# Patient Record
Sex: FEMALE | Race: WHITE
[De-identification: names, ages, dates, MRNs, and addresses within clinical notes are randomized per-mention and may not be internally consistent; named-entity substitution may affect disease eponyms.]

---

## 2020-06-04 ENCOUNTER — HOSPITAL ENCOUNTER (EMERGENCY)
Dept: HOSPITAL 62 - ER | Age: 25
Discharge: HOME | End: 2020-06-04
Payer: OTHER GOVERNMENT

## 2020-06-04 VITALS — DIASTOLIC BLOOD PRESSURE: 60 MMHG | SYSTOLIC BLOOD PRESSURE: 115 MMHG

## 2020-06-04 DIAGNOSIS — Z3A.00: ICD-10-CM

## 2020-06-04 DIAGNOSIS — O26.892: ICD-10-CM

## 2020-06-04 DIAGNOSIS — O36.8120: ICD-10-CM

## 2020-06-04 DIAGNOSIS — Z79.899: ICD-10-CM

## 2020-06-04 DIAGNOSIS — O46.92: Primary | ICD-10-CM

## 2020-06-04 DIAGNOSIS — R10.33: ICD-10-CM

## 2020-06-04 LAB
ADD MANUAL DIFF: NO
ALBUMIN SERPL-MCNC: 4.3 G/DL (ref 3.5–5)
ALP SERPL-CCNC: 77 U/L (ref 38–126)
ANION GAP SERPL CALC-SCNC: 8 MMOL/L (ref 5–19)
APPEARANCE UR: CLEAR
APTT PPP: YELLOW S
AST SERPL-CCNC: 32 U/L (ref 14–36)
BASOPHILS # BLD AUTO: 0.1 10^3/UL (ref 0–0.2)
BASOPHILS NFR BLD AUTO: 0.5 % (ref 0–2)
BILIRUB DIRECT SERPL-MCNC: 0 MG/DL (ref 0–0.4)
BILIRUB SERPL-MCNC: 0.5 MG/DL (ref 0.2–1.3)
BILIRUB UR QL STRIP: NEGATIVE
BUN SERPL-MCNC: 7 MG/DL (ref 7–20)
CALCIUM: 9.6 MG/DL (ref 8.4–10.2)
CHLORIDE SERPL-SCNC: 102 MMOL/L (ref 98–107)
CO2 SERPL-SCNC: 24 MMOL/L (ref 22–30)
EOSINOPHIL # BLD AUTO: 0.1 10^3/UL (ref 0–0.6)
EOSINOPHIL NFR BLD AUTO: 1.1 % (ref 0–6)
ERYTHROCYTE [DISTWIDTH] IN BLOOD BY AUTOMATED COUNT: 12.5 % (ref 11.5–14)
GLUCOSE SERPL-MCNC: 81 MG/DL (ref 75–110)
GLUCOSE UR STRIP-MCNC: NEGATIVE MG/DL
HCT VFR BLD CALC: 36.7 % (ref 36–47)
HGB BLD-MCNC: 13.2 G/DL (ref 12–15.5)
KETONES UR STRIP-MCNC: NEGATIVE MG/DL
LYMPHOCYTES # BLD AUTO: 2 10^3/UL (ref 0.5–4.7)
LYMPHOCYTES NFR BLD AUTO: 18.5 % (ref 13–45)
MCH RBC QN AUTO: 33.2 PG (ref 27–33.4)
MCHC RBC AUTO-ENTMCNC: 35.8 G/DL (ref 32–36)
MCV RBC AUTO: 93 FL (ref 80–97)
MONOCYTES # BLD AUTO: 0.8 10^3/UL (ref 0.1–1.4)
MONOCYTES NFR BLD AUTO: 7.4 % (ref 3–13)
NEUTROPHILS # BLD AUTO: 7.9 10^3/UL (ref 1.7–8.2)
NEUTS SEG NFR BLD AUTO: 72.5 % (ref 42–78)
NITRITE UR QL STRIP: NEGATIVE
PH UR STRIP: 7 [PH] (ref 5–9)
PLATELET # BLD: 260 10^3/UL (ref 150–450)
POTASSIUM SERPL-SCNC: 4.1 MMOL/L (ref 3.6–5)
PROT SERPL-MCNC: 7.8 G/DL (ref 6.3–8.2)
PROT UR STRIP-MCNC: NEGATIVE MG/DL
RBC # BLD AUTO: 3.96 10^6/UL (ref 3.72–5.28)
SP GR UR STRIP: 1.01
TOTAL CELLS COUNTED % (AUTO): 100 %
UROBILINOGEN UR-MCNC: NEGATIVE MG/DL (ref ?–2)
WBC # BLD AUTO: 10.9 10^3/UL (ref 4–10.5)

## 2020-06-04 PROCEDURE — 76805 OB US >/= 14 WKS SNGL FETUS: CPT

## 2020-06-04 PROCEDURE — 81001 URINALYSIS AUTO W/SCOPE: CPT

## 2020-06-04 PROCEDURE — 80053 COMPREHEN METABOLIC PANEL: CPT

## 2020-06-04 PROCEDURE — 85025 COMPLETE CBC W/AUTO DIFF WBC: CPT

## 2020-06-04 PROCEDURE — 93976 VASCULAR STUDY: CPT

## 2020-06-04 PROCEDURE — 99284 EMERGENCY DEPT VISIT MOD MDM: CPT

## 2020-06-04 PROCEDURE — 36415 COLL VENOUS BLD VENIPUNCTURE: CPT

## 2020-06-04 NOTE — ER DOCUMENT REPORT
ED General





- General


Chief Complaint: OB Problem (<20wks)


Stated Complaint: VAGINAL BLEEDING/ABDOMINAL PAIN


Time Seen by Provider: 06/04/20 19:39


Primary Care Provider: 


ALEJANDRO PIMENTEL PA [Primary Care Provider] - Follow up as needed


Mode of Arrival: Wheelchair


TRAVEL OUTSIDE OF THE [place] IN LAST 30 DAYS: No





- HPI


Notes: 





Patient is a G3, P1 female approximately 18 weeks gestation who presents to the 

emergency department for evaluation of abdominal pain and a small amount of 

blood.  She states that her and her  had intercourse yesterday.  Today s

he had a stretching and painful sensation in her mid abdomen, just inferior to 

her umbilicus.  She states that after urination today she noticed a small bright

red spot on the toilet paper.  She has had no continued bleeding.  The pain has 

stopped.  She presents to the emergency department for further evaluation.  She 

had an ultrasound on the first, is scheduled to see her OB again in 3 weeks.  Sh

e states that after the first is when she started feeling some fetal movement, 

but notes that it has been decreased to her sensation for the last 2 days.





- Related Data


Home Medications: prenatal vitamins





Past Medical History





- General


Information source: Patient





- Social History


Smoking Status: Never Smoker


Drug Abuse: None


Family History: Reviewed & Not Pertinent


Patient has homicidal ideation: No





Review of Systems





- Review of Systems


Female Genitourinary: See HPI


-: Yes All other systems reviewed and negative





Physical Exam





- Vital signs


Vitals: 





                                        











Temp Pulse Resp BP Pulse Ox


 


 98.7 F   89   14   121/55 L  99 


 


 06/04/20 18:13  06/04/20 18:13  06/04/20 18:13  06/04/20 18:13  06/04/20 18:13














- Notes


Notes: 





Vital signs reviewed, please refer to chart. Head is normocephalic, atraumatic. 

Pupils equal round, reactive to light.  Neck is supple without meningismus.  

Heart is regular rate and rhythm.  Lungs are clear to auscultation bilaterally. 

Abdomen is gravid, nontender, normoactive bowel sounds throughout.  Extremities 

without cyanosis, clubbing. Posterior calves are nontender.  Peripheral pulses 

are equal.  Skin is warm and dry.  Patient is awake, alert, neurological exam is

nonfocal.





Course





- Re-evaluation


Re-evalutation: 





06/04/20 22:06


Patient presents to the emergency department for evaluation.  She had laboratory

investigations as ordered through triage.  Her ultrasound was unremarkable.  Her

cervix is closed.  She had only a tiny amount of blood present on toilet paper, 

she is not continuing to bleed at this time.  I think it was likely from 

intercourse.  She is told she should call her primary OB tomorrow, which is 

[facility], and discussed the symptom.  Otherwise she is not bleeding at 

this time, has no pain, and is stable for outpatient follow-up.  She is to r

eturn to the ED with worsening.





- Vital Signs


Vital signs: 





                                        











Temp Pulse Resp BP Pulse Ox


 


 98.7 F   89   14   121/55 L  99 


 


 06/04/20 19:42  06/04/20 18:13  06/04/20 18:13  06/04/20 18:13  06/04/20 18:13














- Laboratory


Result Diagrams: 


                                 06/04/20 19:50





                                 06/04/20 19:50


Laboratory results interpreted by me: 





                                        











  06/04/20 06/04/20 06/04/20





  19:50 19:50 19:50


 


WBC  10.9 H  


 


Sodium   134.2 L 


 


Urine Blood    SMALL H














Discharge





- Discharge


Clinical Impression: 


 Second trimester bleeding





Condition: Stable


Disposition: HOME, SELF-CARE


Instructions:  Bleeding During Early Pregnancy ([facility])


Additional Instructions: 


Your ultrasound and blood work here today were normal.  Please contact your OB 

for appropriate follow-up, discussed the small amount of bleeding you had today.

 If you start having heavier bleeding, increased pain, or any other new or 

concerning symptoms, please return immediately to the emergency department for 

evaluation.


Referrals: 


ALEJANDRO PIMENTEL PA [Primary Care Provider] - Follow up as needed

## 2020-06-04 NOTE — RADIOLOGY REPORT (SQ)
EXAM DESCRIPTION: 



US PREGNANCY FOLLOW UP



COMPLETED DATE/TME:  06/04/2020 19:44



CLINICAL HISTORY: 



24 years, Female, pain



COMPARISON:

None.



TECHNIQUE:

Axial 2-D grayscale images of the pelvis were acquired. Doppler

was utilized.



LIMITATIONS:

None.



FINDINGS:



Single live intrauterine pregnancy is identified with

measurements as follows:

Cervix is closed, measuring 2.9 cm in length.

Biparietal diameter: 4.38 cm

Head circumference: 16.89 cm

Abdominal circumference: 15.49 cm

Femoral length: 3.02 cm

Estimated fetal weight is 324 g (11 ounces).

Estimated gestational age is 19 weeks and 6 days for an estimated

date of delivery of 10/23/2020

Fetal heart rate is 155 bpm

Placenta is posterior, grade one

Presentation is breech

The distance of the placenta tip to the internal os of the cervix

is 2.6 cm.

Largest vertical pocket of amniotic fluid is 4.1 cm.



IMPRESSION:



Single live intrauterine pregnancy, as above. No acute findings.



Low-lying placenta. Continued surveillance is suggested.

 



copyright 2011 [employer]- All Rights Reserved

## 2020-06-04 NOTE — ER DOCUMENT REPORT
ED Medical Screen (RME)





- General


Chief Complaint: Vaginal Bleeding


Stated Complaint: VAGINAL BLEEDING/ABDOMINAL PAIN


Time Seen by Provider: 06/04/20 19:39


Mode of Arrival: Wheelchair


Notes: 





HPI; 24-year-old female who states she is 18 weeks pregnant presents to the 

emergency room complaining of pelvic cramping decreased fetal movement for 2 

days.  Patient states she saw her OB 3 days ago with a normal ultrasound.  

Patient states she took a nap and when she woke up and went to go to the 

bathroom when she wiped she noticed a spot of bright red blood.  Has not seen 

any blood since.





PE: Alert and oriented x3.  Mild distress noted.  Lungs were clear to 

auscultation without rales rhonchi or wheezes.  Heart regular rate rhythm 

without murmurs rubs or gallops.  Gravid abdomen.  Nontender to palpation.








I have greeted and performed a rapid initial assessment of this patient.  A 

comprehensive ED assessment and evaluation of the patient, analysis of test 

results and completion of the medical decision making process will be conducted 

by additional ED providers.  I have specifically instructed the patient or 

family members with the patient to immediately return to any nursing staff 

should anything change in the patient's condition or with their chief complaint.


TRAVEL OUTSIDE OF THE [place] IN LAST 30 DAYS: No





Physical Exam





- Vital signs


Vitals: 





                                        











Temp Pulse Resp BP Pulse Ox


 


 98.7 F   89   14   121/55 L  99 


 


 06/04/20 18:13  06/04/20 18:13  06/04/20 18:13  06/04/20 18:13  06/04/20 18:13














Course





- Vital Signs


Vital signs: 





                                        











Temp Pulse Resp BP Pulse Ox


 


 98.7 F   89   14   121/55 L  99 


 


 06/04/20 18:13  06/04/20 18:13  06/04/20 18:13  06/04/20 18:13  06/04/20 18:13